# Patient Record
Sex: FEMALE | Race: WHITE | NOT HISPANIC OR LATINO | URBAN - METROPOLITAN AREA
[De-identification: names, ages, dates, MRNs, and addresses within clinical notes are randomized per-mention and may not be internally consistent; named-entity substitution may affect disease eponyms.]

---

## 2021-04-12 ENCOUNTER — APPOINTMENT (OUTPATIENT)
Dept: GENERAL RADIOLOGY | Facility: HOSPITAL | Age: 21
End: 2021-04-12

## 2021-04-12 ENCOUNTER — HOSPITAL ENCOUNTER (EMERGENCY)
Facility: HOSPITAL | Age: 21
Discharge: HOME | End: 2021-04-12
Attending: EMERGENCY MEDICINE | Admitting: EMERGENCY MEDICINE

## 2021-04-12 VITALS
SYSTOLIC BLOOD PRESSURE: 114 MMHG | OXYGEN SATURATION: 98 % | BODY MASS INDEX: 18.96 KG/M2 | HEART RATE: 80 BPM | DIASTOLIC BLOOD PRESSURE: 74 MMHG | HEIGHT: 72 IN | RESPIRATION RATE: 16 BRPM | TEMPERATURE: 98.7 F | WEIGHT: 140 LBS

## 2021-04-12 DIAGNOSIS — Z77.098 CHEMICAL EXPOSURE: ICD-10-CM

## 2021-04-12 DIAGNOSIS — R06.02 SHORTNESS OF BREATH: Primary | ICD-10-CM

## 2021-04-12 DIAGNOSIS — F41.9 ANXIETY: ICD-10-CM

## 2021-04-12 PROCEDURE — 71045 X-RAY EXAM CHEST 1 VIEW: CPT

## 2021-04-12 PROCEDURE — 99283 EMERGENCY DEPT VISIT LOW MDM: CPT

## 2021-04-12 RX ORDER — ALBUTEROL SULFATE 90 UG/1
2 AEROSOL, METERED RESPIRATORY (INHALATION) EVERY 4 HOURS PRN
Qty: 6.7 G | Refills: 0 | Status: SHIPPED | OUTPATIENT
Start: 2021-04-12

## 2021-04-12 NOTE — ED PROVIDER NOTES
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 She was using a chemical  and began coughing and felt short of breath. Typically improves with pressure but has been constant for last day or so. Currently reports feeling better.     On exam,   General: Awake, alert, no acute distress, nontoxic, nondiaphoretic  HEENT: Mucous membranes moist, atraumatic, EOMI  Neck: Full ROM  Pulm: Symmetric chest rise, nonlabored, lungs CTAB  Cardiovascular: Regular rate and rhythm, intact distal pulses  MSK: Full ROM, no deformity  Skin: Warm, dry  Neuro: Alert and oriented x 3, GCS 15, moving all extremities, no focal deficits  Psych: Calm, cooperative      Surgical mask, protective eye goggles, and gloves used during this encounter. Patient in surgical mask.      Plan:   ED Course as of Apr 12 1546   Mon Apr 12, 2021   1455 O2 sat 99% on RA. Pt tearful. Speaking in full sentences. Not objectively soa. Will continue to monitor     [JS]      ED Course User Index  [JS] Desirae Correa, ANDRES     Plan for discharge, possible chemical pneumonitis versus bronchitis, no further medications required at this time. ED return for worsening symptoms as needed.     Attestation:  The FANNY and I have discussed this patient's history, physical exam, and treatment plan.  I have reviewed the documentation and personally had a face to face interaction with the patient. I affirm the documentation and agree with the treatment and plan.  The attached note describes my personal findings.          Zaki Douglas MD  04/12/21 5610

## 2021-04-12 NOTE — ED PROVIDER NOTES
" EMERGENCY DEPARTMENT ENCOUNTER    Room Number:  42/42  Date of encounter:  4/12/2021  PCP: Provider, No Known  Historian: patient   Full history not obtainable due to: none     HPI:  Chief Complaint: SOA     Context: Hanh Stelring is a 20 y.o. 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 She states she was using a chemical  and began coughing and feeling short of breath. She states this happens frequently but improves when she gets some fresh air. This episode has been constant for the last 24 hours. Does not seem to be improving and was severe this am. She went to the [facility] who would not let her drive and referred her to the ER. EMS transported her here. She denies any fever. No n/v. No chest pain. No known exposures. No hx of DVT. No recent prolonged immobilization. No hemoptysis. No use of birth control. Non smoker. Affirms increased stress due to workload at school and working a full time job. She states \"I just have a lot going on right now\".     No hx of lung disease.         PAST MEDICAL HISTORY    Active Ambulatory Problems     Diagnosis Date Noted   • No Active Ambulatory Problems     Resolved Ambulatory Problems     Diagnosis Date Noted   • No Resolved Ambulatory Problems     No Additional Past Medical History         PAST SURGICAL HISTORY  No past surgical history on file.      FAMILY HISTORY  No family history on file.      SOCIAL HISTORY  Social History     Socioeconomic History   • Marital status: Single     Spouse name: Not on file   • Number of children: Not on file   • Years of education: Not on file   • Highest education level: Not on file         ALLERGIES  Patient has no known allergies.        REVIEW OF SYSTEMS  Review of Systems   All systems reviewed and marked as negative except as listed in HPI       PHYSICAL EXAM    I have reviewed the triage vital signs and nursing notes.    ED Triage Vitals   Temp Heart Rate " Resp BP SpO2   04/12/21 1421 04/12/21 1420 04/12/21 1420 04/12/21 1420 04/12/21 1420   98.7 °F (37.1 °C) 108 22 111/85 97 %      Temp src Heart Rate Source Patient Position BP Location FiO2 (%)   04/12/21 1421 -- -- -- --   Oral           GENERAL: alert well developed, well nourished in no distress, tearful   HENT: NCAT, neck supple, trachea midline  EYES: no scleral icterus, PERRL, normal conjunctivae  CV: regular rhythm, regular rate, no murmur  RESPIRATORY: unlabored effort, CTAB  ABDOMEN: soft, nontender, nondistended, bowel sounds present  MUSCULOSKELETAL: no gross deformity  NEURO: alert,  sensory and motor function of extremities grossly intact, speech clear, mental status normal/baseline  SKIN: warm, dry, no rash  PSYCH:  Appropriate mood and affect    Vital signs and nursing notes reviewed.        RADIOLOGY  XR Chest 1 View    Result Date: 4/12/2021  ONE VIEW PORTABLE CHEST  HISTORY: Shortness of breath.  FINDINGS: The lungs are well-expanded and clear and the heart size is normal. There is no acute disease.        I ordered the above noted radiological studies. Independently reviewed by me and discussed with radiologist.  See dictation above for official radiology interpretation.      PROCEDURES    Procedures        MEDICATIONS GIVEN IN ER    Medications - No data to display      PROGRESS, DATA ANALYSIS, CONSULTS, AND MEDICAL DECISION MAKING    All labs have been independently reviewed by me.  All radiology studies have been reviewed by me.   EKG's independently reviewed by me.  Discussion below represents my analysis of pertinent findings related to patient's condition, differential diagnosis, treatment plan and final disposition.    DIFFERENTIAL DIAGNOSIS INCLUDE BUT NOT LIMITED TO: Viral syndrome, COVID-19, URI, pneumonia, bronchitis, ARDS, respiratory failure with hypoxia, PE, congestive heart failure exacerbation, sepsis      ED Course as of Apr 12 1812   Mon Apr 12, 2021   1455 O2 sat 99% on RA. Pt  tearful. Speaking in full sentences. Not objectively soa. Will continue to monitor     [JS]   1530 I viewed cxr on pacs. My findings are no cm.     [JS]   1624 Pt ambulated in hallway. No increased WOB noted. Ox sat 98%    [JS]   1810 Pt sense of shortness of breath after local exposure yesterday.  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  It normally clears up after she is exposed to fresh air.  Her chest x-ray is clear.  She is not objectively short of breath and speaking in full sentences.  Her oxygen saturation is 98%.  Heart was initially mildly tachycardic however she did receive albuterol in route by EMS.  At time of discharge it is 80.  She has no risk factors for PE and my underlying suspicion for PE is low.  I will provide her with an albuterol inhaler for shortness of breath to use as needed and she is to follow-up with her family doctor.  Additionally she has been encouraged to avoid use of noxious chemicals in poorly ventilated areas.    [JS]      ED Course User Index  [JS] Desirae Correa APRN       AS OF 18:12 EDT VITALS:        BP - 114/74  HR - 80  TEMP - 98.7 °F (37.1 °C) (Oral)  O2 SATS - 98%         Medication List      New Prescriptions    albuterol sulfate  (90 Base) MCG/ACT inhaler  Commonly known as: PROVENTIL HFA;VENTOLIN HFA;PROAIR HFA  Inhale 2 puffs Every 4 (Four) Hours As Needed for Wheezing.           Where to Get Your Medications      You can get these medications from any pharmacy    Bring a paper prescription for each of these medications  · albuterol sulfate  (90 Base) MCG/ACT inhaler           DIAGNOSIS  Final diagnoses:   Shortness of breath   Anxiety   Chemical exposure         DISPOSITION  Discharge     Pt masked in first look. I wore appropriate PPE throughout my encounters with the pt. I performed hand hygiene on entry into the pt room and upon  exit.     Dictated utilizing Dragon dictation:  Much of this encounter note is an electronic transcription/translation of spoken language to printed text. The electronic translation of spoken language may permit erroneous, or at times, nonsensical words or phrases to be inadvertently transcribed; Although I have reviewed the note for such errors, some may still exist.     Desirae Correa, ANDRES  04/12/21 1811

## 2021-04-12 NOTE — DISCHARGE INSTRUCTIONS
Home to rest.  Treat plenty fluids.  Avoid exposure to strong cleaning chemicals in non ventilated areas  Follow-up with your family doctor.  Return for worsening symptoms new concerns  Continue care with your primary care physician and have your blood pressure regularly checked and managed. Normal blood pressure is 120/80.

## 2021-04-12 NOTE — ED NOTES
Sent from [facility] for SOA, CP, states she was given ventolin inhlaer x 4 puffs and solumedrol 125 PTA. EMS states stridor PTA,. Pt relays considerable recent anxiety d/t school     Pt placed in mask at triage, all staff wearing appropriate ppe        Desirae Kaur, RN  04/12/21 7283